# Patient Record
Sex: FEMALE | Race: WHITE
[De-identification: names, ages, dates, MRNs, and addresses within clinical notes are randomized per-mention and may not be internally consistent; named-entity substitution may affect disease eponyms.]

---

## 2023-02-09 ENCOUNTER — HOSPITAL ENCOUNTER (EMERGENCY)
Dept: HOSPITAL 76 - ED | Age: 33
Discharge: HOME | End: 2023-02-09
Payer: MEDICAID

## 2023-02-09 VITALS — SYSTOLIC BLOOD PRESSURE: 123 MMHG | DIASTOLIC BLOOD PRESSURE: 83 MMHG

## 2023-02-09 DIAGNOSIS — S60.221A: Primary | ICD-10-CM
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
## 2023-02-09 PROCEDURE — 96372 THER/PROPH/DIAG INJ SC/IM: CPT

## 2023-02-09 PROCEDURE — 99283 EMERGENCY DEPT VISIT LOW MDM: CPT

## 2023-02-09 PROCEDURE — 73130 X-RAY EXAM OF HAND: CPT

## 2023-02-09 PROCEDURE — 99284 EMERGENCY DEPT VISIT MOD MDM: CPT

## 2023-02-09 NOTE — XRAY REPORT
PROCEDURE:  Hand 3 View RT

 

INDICATIONS:  pain after fall

 

TECHNIQUE:  3 views of the hand acquired.  

 

COMPARISON:  None.

 

FINDINGS:  

 

Bones:  No fractures or dislocations.  No suspicious bony lesions.  

 

Soft tissues:  No suspicious soft tissue calcifications.  

 

IMPRESSION:  

 

1. No fracture or dislocation.

 

Reviewed by: Raffaele Garcia MD on 2/9/2023 9:12 PM PST

Approved by: Raffaele Garcia MD on 2/9/2023 9:12 PM [facility]

 

 

Station ID:  [id number]
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
